# Patient Record
Sex: MALE | Employment: OTHER | URBAN - METROPOLITAN AREA
[De-identification: names, ages, dates, MRNs, and addresses within clinical notes are randomized per-mention and may not be internally consistent; named-entity substitution may affect disease eponyms.]

---

## 2018-11-15 ENCOUNTER — CONSULTATION (OUTPATIENT)
Dept: URBAN - METROPOLITAN AREA CLINIC 11 | Facility: CLINIC | Age: 83
End: 2018-11-15

## 2018-11-15 ASSESSMENT — TONOMETRY
OD_IOP_MMHG: 22,22
OS_IOP_MMHG: 16

## 2018-11-15 ASSESSMENT — VISUAL ACUITY: OD_CC: 20/70-2

## 2020-06-29 ENCOUNTER — IMPORTED ENCOUNTER (OUTPATIENT)
Dept: URBAN - METROPOLITAN AREA CLINIC 9 | Facility: CLINIC | Age: 85
End: 2020-06-29

## 2021-08-12 NOTE — PATIENT DISCUSSION
PATIENT UNDERSTANDS INCREASED RISKS WITH SURGERY DUE TO AGE AND THE SUPPORTING ELEMENTS OF THE EYE ARE WEAKER.

## 2021-09-02 NOTE — PATIENT DISCUSSION
Continue: prednisolone-moxiflox-bromfen (prednisolone-moxiflox-bromfen): drops,suspension: 1-0.5-0.075% 1 drop three times a day as directed into affected eye 08-

## 2021-09-02 NOTE — PATIENT DISCUSSION
Surgery  Counseling: I have discussed the option of glasses versus cataract surgery versus following . It was explained that when vision no longer meets the patient's visual needs and a new prescription for glasses is not likely to improve all of the patient's visual symptoms, the option of cataract surgery is a reasonable next step. It was explained that there is no guarantee that removing the cataract will improve their visual symptoms, however; it is believed that the cataract is contributing to the patient's visual impairment and surgery may significantly improve both the visual and functional status of the patient. The risks, benefits and alternatives of surgery were discussed with the patient. After this discussion, the patient desires to proceed with cataract surgery with implantation of an intraocular lens to improve vision for distance and glare symptoms.

## 2021-10-08 NOTE — PATIENT DISCUSSION
Discussed that a PVD is a natural aging change of the vitreous where it shrinks and pulls away from the retina. For most people, a PVD is benign event with no symptoms and no vision loss. Others may notice Floaters/Flashes but over time they usually become less noticeable. In a small amount of cases the vitreous can pull too hard from the back of the eye and result in a retinal hole or tear.

## 2021-10-17 ASSESSMENT — VISUAL ACUITY
OD_CC: 20/200 SN
OD_CC: 20/100 + SN

## 2021-10-17 ASSESSMENT — TONOMETRY
OD_IOP_MMHG: 22
OS_IOP_MMHG: 13

## 2022-03-08 NOTE — PATIENT DISCUSSION
Retinal tear and detachment warning symptoms reviewed and patient instructed to call immediately if increasing floaters, flashes, or decreasing peripheral vision. Voicemail left for Lyssa Hayden to call back to review recommendations from Dr. Clayton Moss as below. Patient will need Indapamide to help with proteinuria   If he has worsening leg edema, best approach is to cut down amlodipine 10 mg daily to 5 mg daily and give 5 day course of Bumex 2 mg p.o. daily until leg swelling resolves. Once leg swelling resolves, will stop Bumex, then switch hydralazine to indapamide.